# Patient Record
Sex: FEMALE | Race: WHITE
[De-identification: names, ages, dates, MRNs, and addresses within clinical notes are randomized per-mention and may not be internally consistent; named-entity substitution may affect disease eponyms.]

---

## 2017-09-29 ENCOUNTER — HOSPITAL ENCOUNTER (OUTPATIENT)
Dept: HOSPITAL 80 - BRMIMAGING | Age: 67
End: 2017-09-29
Attending: GENERAL ACUTE CARE HOSPITAL
Payer: COMMERCIAL

## 2017-09-29 DIAGNOSIS — Z12.31: Primary | ICD-10-CM

## 2017-09-29 DIAGNOSIS — Z80.3: ICD-10-CM

## 2017-09-29 PROCEDURE — G0202 SCR MAMMO BI INCL CAD: HCPCS

## 2018-06-08 ENCOUNTER — HOSPITAL ENCOUNTER (INPATIENT)
Dept: HOSPITAL 80 - F3N | Age: 68
LOS: 8 days | Discharge: NURSING, REHAB OR LONG-TERM CARE | DRG: 454 | End: 2018-06-16
Attending: NEUROLOGICAL SURGERY | Admitting: NEUROLOGICAL SURGERY
Payer: COMMERCIAL

## 2018-06-08 DIAGNOSIS — J44.9: ICD-10-CM

## 2018-06-08 DIAGNOSIS — Z87.891: ICD-10-CM

## 2018-06-08 DIAGNOSIS — R00.0: ICD-10-CM

## 2018-06-08 DIAGNOSIS — E11.9: ICD-10-CM

## 2018-06-08 DIAGNOSIS — D62: ICD-10-CM

## 2018-06-08 DIAGNOSIS — G89.29: ICD-10-CM

## 2018-06-08 DIAGNOSIS — M41.26: Primary | ICD-10-CM

## 2018-06-08 DIAGNOSIS — R41.0: ICD-10-CM

## 2018-06-08 DIAGNOSIS — M51.36: ICD-10-CM

## 2018-06-08 PROCEDURE — P9016 RBC LEUKOCYTES REDUCED: HCPCS

## 2018-06-08 PROCEDURE — C1713 ANCHOR/SCREW BN/BN,TIS/BN: HCPCS

## 2018-06-08 PROCEDURE — 0RG70AJ FUSION OF 2 TO 7 THORACIC VERTEBRAL JOINTS WITH INTERBODY FUSION DEVICE, POSTERIOR APPROACH, ANTERIOR COLUMN, OPEN APPROACH: ICD-10-PCS | Performed by: NEUROLOGICAL SURGERY

## 2018-06-08 PROCEDURE — P9041 ALBUMIN (HUMAN),5%, 50ML: HCPCS

## 2018-06-08 PROCEDURE — 0SG10AJ FUSION OF 2 OR MORE LUMBAR VERTEBRAL JOINTS WITH INTERBODY FUSION DEVICE, POSTERIOR APPROACH, ANTERIOR COLUMN, OPEN APPROACH: ICD-10-PCS | Performed by: NEUROLOGICAL SURGERY

## 2018-06-08 PROCEDURE — 0RGA071 FUSION OF THORACOLUMBAR VERTEBRAL JOINT WITH AUTOLOGOUS TISSUE SUBSTITUTE, POSTERIOR APPROACH, POSTERIOR COLUMN, OPEN APPROACH: ICD-10-PCS | Performed by: NEUROLOGICAL SURGERY

## 2018-06-08 PROCEDURE — 0RG7071 FUSION OF 2 TO 7 THORACIC VERTEBRAL JOINTS WITH AUTOLOGOUS TISSUE SUBSTITUTE, POSTERIOR APPROACH, POSTERIOR COLUMN, OPEN APPROACH: ICD-10-PCS | Performed by: NEUROLOGICAL SURGERY

## 2018-06-08 PROCEDURE — 8E0WXBZ COMPUTER ASSISTED PROCEDURE OF TRUNK REGION: ICD-10-PCS | Performed by: NEUROLOGICAL SURGERY

## 2018-06-08 PROCEDURE — 0SG1071 FUSION OF 2 OR MORE LUMBAR VERTEBRAL JOINTS WITH AUTOLOGOUS TISSUE SUBSTITUTE, POSTERIOR APPROACH, POSTERIOR COLUMN, OPEN APPROACH: ICD-10-PCS | Performed by: NEUROLOGICAL SURGERY

## 2018-06-08 PROCEDURE — 0RGA0AJ FUSION OF THORACOLUMBAR VERTEBRAL JOINT WITH INTERBODY FUSION DEVICE, POSTERIOR APPROACH, ANTERIOR COLUMN, OPEN APPROACH: ICD-10-PCS | Performed by: NEUROLOGICAL SURGERY

## 2018-06-08 PROCEDURE — 30233N1 TRANSFUSION OF NONAUTOLOGOUS RED BLOOD CELLS INTO PERIPHERAL VEIN, PERCUTANEOUS APPROACH: ICD-10-PCS | Performed by: NEUROLOGICAL SURGERY

## 2018-06-08 PROCEDURE — 4A1004G MONITORING OF CENTRAL NERVOUS ELECTRICAL ACTIVITY, INTRAOPERATIVE, OPEN APPROACH: ICD-10-PCS | Performed by: NEUROLOGICAL SURGERY

## 2018-06-08 PROCEDURE — 0SP30AZ REMOVAL OF INTERBODY FUSION DEVICE FROM LUMBOSACRAL JOINT, OPEN APPROACH: ICD-10-PCS | Performed by: NEUROLOGICAL SURGERY

## 2018-06-08 RX ADMIN — Medication SCH MG: at 20:39

## 2018-06-08 RX ADMIN — HYDROMORPHONE HYDROCHLORIDE PRN MG: 1 INJECTION, SOLUTION INTRAMUSCULAR; INTRAVENOUS; SUBCUTANEOUS at 20:42

## 2018-06-08 RX ADMIN — DOCUSATE SODIUM AND SENNOSIDES SCH TAB: 50; 8.6 TABLET ORAL at 20:39

## 2018-06-08 RX ADMIN — SODIUM CHLORIDE, SODIUM LACTATE, POTASSIUM CHLORIDE, AND CALCIUM CHLORIDE SCH MLS: 600; 310; 30; 20 INJECTION, SOLUTION INTRAVENOUS at 17:23

## 2018-06-08 RX ADMIN — DEXMEDETOMIDINE HYDROCHLORIDE SCH MLS: 4 INJECTION, SOLUTION INTRAVENOUS at 17:23

## 2018-06-08 RX ADMIN — Medication SCH MLS: at 22:00

## 2018-06-08 RX ADMIN — GABAPENTIN SCH MG: 300 CAPSULE ORAL at 20:39

## 2018-06-08 RX ADMIN — ACETAMINOPHEN SCH MG: 500 TABLET ORAL at 20:38

## 2018-06-08 RX ADMIN — SODIUM CHLORIDE, SODIUM LACTATE, POTASSIUM CHLORIDE, AND CALCIUM CHLORIDE SCH MLS: 600; 310; 30; 20 INJECTION, SOLUTION INTRAVENOUS at 20:51

## 2018-06-08 RX ADMIN — DEXMEDETOMIDINE HYDROCHLORIDE SCH MLS: 4 INJECTION, SOLUTION INTRAVENOUS at 22:30

## 2018-06-08 RX ADMIN — HYDROMORPHONE HYDROCHLORIDE PRN MG: 1 INJECTION, SOLUTION INTRAMUSCULAR; INTRAVENOUS; SUBCUTANEOUS at 17:01

## 2018-06-08 NOTE — PDANEPAE
ANE Past Medical History





- Cardiovascular History


Hx Hypertension: No


Hx Arrhythmias: No


Hx Chest Pain: No


Hx Coronary Artery / Peripheral Vascular Disease: No


Hx CHF / Valvular Disease: No


Hx Palpitations: No


Cardiovascular History Comment: HX OF LOW BP





- Pulmonary History


Hx COPD: No


Hx Asthma/Reactive Airway Disease: No


Hx Recent Upper Respiratory Infection: No


Hx Oxygen in Use at Home: No


Hx Sleep Apnea: No


Sleep Apnea Screening Result - Last Documented: Negative





- Neurologic History


Hx Cerebrovascular Accident: No


Hx Seizures: No


Hx Dementia: No





- Endocrine History


Hx Diabetes: No


Endocrine History Comment: HYPOTHYROID





- Renal History


Hx Renal Disorders: No





- Liver History


Hx Hepatic Disorders: No





- Neurological & Psychiatric Hx


Hx Neurological and Psychiatric Disorders: Yes


Neurological / Psychiatric History Comment: DEPRESSION





- Cancer History


Hx Cancer: Yes


Cancer History Comment: COLON





- Congenital Disorder History


Hx Congenital Disorders: Yes


Congenital History Comment: SCOLOSIS





- GI History


Hx Gastrointestinal Disorders: Yes


Gastrointestinal History Comment: GASTRIC BYPASS 2001.  GERD





- Other Health History


Other Health History: FLAT BACK.  DDD.  NT HAILEY TOES





- Chronic Pain History


Chronic Pain: Yes (LOWER AND MIDDLE BACK,SOMETIME CERVICAL)





- Surgical History


Prior Surgeries: FAILED ATTEMPT AT HARDWARE REMVL AND REVISION DUE TO CARDIAC 

ARREST 1/2018 AT [facility].  LUMBAR FUSION/SACROPELVIS FUSION 2013.  LT 

ILIAC VEIN ANGIOPLASTY.  NEURO STIMULATOR 2014.  HAILEY CATARACT.  TONSILLECTOMY.  

COLECTOMY FOR CA 1995.  TUBAL LIGATION.  HAILEY REMVL GANGLION CYST.  GASTRIC 

BYPASS 2001





ANE Review of Systems


Review of Systems: 








- Exercise capacity


METS (RN): 1 METS





ANE Patient History





- Allergies


Allergies/Adverse Reactions: 








alendronate sodium [From Fosamax] Allergy (Severe, Verified 10/12/15 13:47)


 Vomiting


amoxicillin trihydrate [From Augmentin] Allergy (Severe, Verified 10/12/15 13:47

)


 Vomiting


codeine [Codeine] Allergy (Severe, Verified 10/12/15 13:47)


 Vomiting


metformin HCl [From Glucophage] Allergy (Severe, Verified 10/12/15 13:47)


 N/V


NSAIDS (Non-Steroidal Anti-Inflamma Allergy (Severe, Verified 10/12/15 13:47)


 HIVES, VOMITING


potassium clavulanate [From Augmentin] Allergy (Severe, Verified 10/12/15 13:47)


 Vomiting


pregabalin [From Lyrica] Allergy (Severe, Verified 10/12/15 13:47)


 VOMITING, HIVES


Sulfa (Sulfonamide Antibiotics) [Sulfa(Sulfonamide Antibiotics)] Allergy (Severe

, Verified 10/12/15 13:47)


 HIVES, VOMITING


acetaminophen [From Darvocet-N 100] Allergy (Intermediate, Verified 10/12/15 13:

47)


 Hives


doxycycline Allergy (Intermediate, Verified 10/12/15 13:47)


 DIARRHEA, HIVES


propoxyphene napsylate [From Darvocet-N 100] Allergy (Intermediate, Verified 09/ 23/13 09:04)


 Hives


morphine Allergy (Unknown, Verified 09/23/13 09:04)


 Hives








- Home Medications


Home Medications: 








Abaloparatide [Tymlos] 1.56 ml SQ HS 06/04/18 [Last Taken 06/07/18 22:00]


Calcium Carb W/Vit D [Calcium Carb W/Vit D 500/200 (*)] 500 mg PO BID 06/04/18 [

Last Taken 06/01/18]


Cholecalciferol Vit D3 [Vitamin D3 2000 units tab (OTC)] 2,000 units PO DAILY 06 /04/18 [Last Taken 06/01/18]


Cyanocobalamin [Vitamin B12 1000MCG/ML (*)] 1,000 mcg IM Q30D 06/04/18 [Last 

Taken 06/01/18]


DULoxetine [Cymbalta 60 MG (*)] 120 mg PO DAILY 06/04/18 [Last Taken 06/08/18 03

:15]


Esomeprazole Magnesium [Nexium] 20 mg PO DAILY 06/04/18 [Last Taken 06/08/18 03:

15]


Herbals/Supplements -Info Only 1 ea PO DAILY 06/04/18 [Last Taken 06/01/18]


LORazepam [Ativan (*)] 1 mg PO HS 06/04/18 [Last Taken 06/07/18 22:00]


Levothyroxine [Synthroid 125 mcg (*)] 125 mcg PO DAILY06 06/04/18 [Last Taken 06 /08/18 03:15]


Multivitamins [Multivitamin (*)] 1 each PO DAILY 06/04/18 [Last Taken 06/01/18]


Omega-3 Fatty Acids [Fish Oil 1000 mg (*)] 1,000 mg PO BID 06/04/18 [Last Taken 

06/01/18]


Tizanidine HCl 4 mg PO Q8HRS PRN 06/04/18 [Last Taken 06/08/18 03:30]


oxyCODONE IR [Oxycodone Ir (*)] 15 mg PO Q6HRS PRN 06/04/18 [Last Taken 06/08/ 18 03:15]


traZODone [traZODone 150MG (*)] 300 mg PO HS 06/04/18 [Last Taken 06/07/18 22:00

]








- NPO status


NPO Since - Liquids (Date): 06/08/18


NPO Since - Liquids (Time): 03:15


NPO Since - Solids (Date): 06/07/18


NPO Since - Solids (Time): 22:00





- Smoking Hx


Smoking Status: Never smoked





ANE Labs/Vital Signs





- Vital Signs


Blood Pressure: 85/50


Heart Rate: 62


Respiratory Rate: 16


O2 Sat (%): 95


Height: 162.56 cm


Weight: 71.668 kg





ANE Physical Exam





- Airway


Neck exam: FROM


Mallampati Score: Class 2


Mouth exam: dentures





- Pulmonary


Pulmonary: no respiratory distress





- Cardiovascular


Cardiovascular: regular rate and rhythym





- ASA Status


ASA Status: III





ANE Anesthesia Plan


Anesthesia Plan: general endotracheal anesthesia (art line, possible blood 

products. PION risks discussed)

## 2018-06-08 NOTE — PDHPUP
History & Physical Update


H&P update statement: 


This history and physical update is based on an assessment of the patient which 

was completed after admission or registration (within 24 hours), but prior to 

the surgery/procedure.





H&P update: H&P reviewed & patient examined, no change in patient's condition 

since H&P completed (All questions answered.  Consents signed and site marked.)

## 2018-06-08 NOTE — PDMN
Medical Necessity


Medical necessity: [facility] IP only surgery; T10-Pelvis Revision w/L12 TLIF & L3 

PSO (cpt 78188, 77830, 52955, 85708)

## 2018-06-08 NOTE — POSTOPPROG
Post Op Note


Date of Operation: 06/08/18


Surgeon: Krishan Andersen


Assistant: Von Milan


Anesthesiologist: lissette miller


Anesthesia: GET(General Endotracheal)


Pre-op Diagnosis: degenerative scoliosis


Post-op Diagnosis: degenerative scoliosis


Indication: deformity and chronic pain


Procedure: T10-pelvis PSF, scoliosis deformity correction


Findings: see full op note


Inf/Abcess present in the surg proc area at time of surgery?: No


Depth: Organ Space


EBL: Greater than 1000


Drains: Dion Galdamez

## 2018-06-08 NOTE — POSTANESTH
Post Anesthetic Evaluation


Cardiovascular Status: Similar to Pre-Op Cond


Respiratory Status: Similar to Pre-op Cond.


Level of Consciousness/Mental Status: Mildly Sleepy, Arousable


Pain Control: Inadeq, Add Tx Required


Nausea/Vomiting Control: Adequate, Prn Tx Ordered


Complications Possibly Related to Anesthesia: None Noted

## 2018-06-08 NOTE — NEUSURGPN
Date of Surgery: 06/08/18


Post Op Day: 0


Assessment/Plan: 


68F s/p [id number] with L12 TLIF and L3 PSO. >1L blood loss intraprocedure.  





To ICU overnight for pain control and hemodynamics


LR 100ml/hr


precedex and dilaudid PCA for pain control


ADAT


no brace needed


follow H&H in AM


PT/OT


JPX2 to full suction


DVT ppx, WILDA's, SCD's, IVC placed preoperatively, Lovenox POD#1


Chavira out POD#1





Pt seen by and dw Dr. Nixon





Subjective: 


no back pain, complaining of right sided abdominal pain


Objective: 


VSS


NAD


speech clear and fluent, no facial droop


cnii-xii grossly intact


MAEx4, 5/5=


SILT


incision dressed, CDI.


JPx2 to full suction.  


Urinary Catheter in Place: Yes


Urinary Catheter Indication: Accurate I & O Required





Neurosurgery Physical Exam





- Vitals, I&O, Labs





 I and O











 06/07/18 06/08/18 06/09/18





 05:59 05:59 05:59


 


Weight   71.668 kg








 Vital Signs











Temp Pulse Resp BP Pulse Ox


 


 37.2 C   112 H  24 H  159/138 H  99 


 


 06/08/18 15:09  06/08/18 15:09  06/08/18 15:09  06/08/18 15:09  06/08/18 15:09














ICD10 Worksheet


Patient Problems: 


 Problems











Problem Status Onset


 


Bacteremia Acute

## 2018-06-09 RX ADMIN — DOCUSATE SODIUM AND SENNOSIDES SCH TAB: 50; 8.6 TABLET ORAL at 09:24

## 2018-06-09 RX ADMIN — OXYCODONE HYDROCHLORIDE PRN MG: 15 TABLET ORAL at 06:18

## 2018-06-09 RX ADMIN — DIAZEPAM PRN MG: 5 TABLET ORAL at 11:25

## 2018-06-09 RX ADMIN — SODIUM CHLORIDE, SODIUM LACTATE, POTASSIUM CHLORIDE, AND CALCIUM CHLORIDE SCH MLS: 600; 310; 30; 20 INJECTION, SOLUTION INTRAVENOUS at 16:34

## 2018-06-09 RX ADMIN — ACETAMINOPHEN SCH MG: 500 TABLET ORAL at 06:18

## 2018-06-09 RX ADMIN — Medication SCH UNITS: at 09:26

## 2018-06-09 RX ADMIN — Medication SCH MLS: at 06:17

## 2018-06-09 RX ADMIN — ACETAMINOPHEN SCH MG: 500 TABLET ORAL at 15:34

## 2018-06-09 RX ADMIN — LEVOTHYROXINE SODIUM SCH MCG: 125 TABLET ORAL at 09:24

## 2018-06-09 RX ADMIN — GABAPENTIN SCH MG: 300 CAPSULE ORAL at 06:17

## 2018-06-09 RX ADMIN — Medication SCH MG: at 21:26

## 2018-06-09 RX ADMIN — GABAPENTIN SCH MG: 300 CAPSULE ORAL at 21:26

## 2018-06-09 RX ADMIN — SODIUM CHLORIDE, SODIUM LACTATE, POTASSIUM CHLORIDE, AND CALCIUM CHLORIDE SCH MLS: 600; 310; 30; 20 INJECTION, SOLUTION INTRAVENOUS at 06:23

## 2018-06-09 RX ADMIN — ENOXAPARIN SODIUM SCH MG: 100 INJECTION SUBCUTANEOUS at 09:24

## 2018-06-09 RX ADMIN — DIAZEPAM PRN MG: 5 TABLET ORAL at 19:23

## 2018-06-09 RX ADMIN — PANTOPRAZOLE SODIUM SCH MG: 40 TABLET, DELAYED RELEASE ORAL at 09:25

## 2018-06-09 RX ADMIN — DOCUSATE SODIUM AND SENNOSIDES SCH TAB: 50; 8.6 TABLET ORAL at 21:26

## 2018-06-09 RX ADMIN — THERA TABS SCH EACH: TAB at 09:25

## 2018-06-09 RX ADMIN — GABAPENTIN SCH MG: 300 CAPSULE ORAL at 15:34

## 2018-06-09 RX ADMIN — OXYCODONE HYDROCHLORIDE PRN MG: 15 TABLET ORAL at 15:53

## 2018-06-09 RX ADMIN — HYDROMORPHONE HYDROCHLORIDE PRN MG: 1 INJECTION, SOLUTION INTRAMUSCULAR; INTRAVENOUS; SUBCUTANEOUS at 09:25

## 2018-06-09 RX ADMIN — ACETAMINOPHEN SCH MG: 500 TABLET ORAL at 21:27

## 2018-06-09 RX ADMIN — Medication SCH MG: at 09:26

## 2018-06-09 RX ADMIN — HYDROMORPHONE HYDROCHLORIDE PRN MG: 1 INJECTION, SOLUTION INTRAMUSCULAR; INTRAVENOUS; SUBCUTANEOUS at 03:45

## 2018-06-09 NOTE — NEUSURGPN
Date of Surgery: 06/08/18


Post Op Day: 1


Assessment/Plan: 


POD#1 68F s/p [id number] with L12 TLIF and L3 PSO. >1L blood loss 

intraprocedure.  





continue ICU overnight for pain control and hemodynamics,  wean precedex, 

Dilaudid PCA if needed for pain, titrate orals.  


continue LR 100ml/hr


500mL bolus x2 for hyptension, tachycardia


ADAT


no brace needed


continue to follow H&H while drains productive, 10.3/33 this am


PT/OT -goal for to chair today


Aggressive bowel protocol.  


JPX2 to full suction


DVT ppx, WILDA's, SCD's, IVC placed preoperatively, Lovenox POD#1


Chavira out POD#1


[facility]po[facility] late next week for deconditioning.  





dw Dr. Nixon





Subjective: 


continues to have back pain and some right sided abdominal pain.


Objective: 


NAD


vitals, hypotensive and mild tachycardia noted


EOMI, PEARLA


no facial droop


speech clear and fluent


MAEx4, antigravity, BLE strenght appears full


SILT


JPX2 to full suction, 


incision CDI.    left 200, right 205


Urinary Catheter in Place: Yes


Urinary Catheter Indication: Accurate I & O Required


Catheter Insertion Date: 06/08/18





- Physician


Discussed Patient with : Tiffani





Neurosurgery Physical Exam





- Vitals, I&O, Labs





 I and O











 06/08/18 06/09/18 06/10/18





 05:59 05:59 05:59


 


Intake Total  2605.9 


 


Output Total  815 


 


Balance  1790.9 


 


Weight  71.668 kg 


 


Intake:   


 


  Oral (ml)  300 


 


  IV Intake (ml)  2000 


 


  IV Infused (ml)  305.9 


 


    Dexmedetomidine HCl 400  64.9 





    mcg In Ns 100 ml @   





    Titrate IV CONT KALANI Rx#:   





    [id number]   


 


    Lr 1,000 ml @ 100 mls/hr  241 





    IV CONT KALANI Rx#:   





    [id number]   


 


Output:   


 


  Urine (ml)  410 


 


    Catheter  410 


 


  JESS Drain Output (ml)  405 


 


    Left Posterior  205 


 


    Right Posterior  200 








 Vital Signs











Temp Pulse Resp BP Pulse Ox


 


 36.9 C   119 H  19   102/70   95 


 


 06/09/18 10:00  06/09/18 10:00  06/09/18 10:00  06/09/18 10:00  06/09/18 10:00








 Laboratory Results





 06/09/18 06:14 





 06/09/18 06:14 











ICD10 Worksheet


Patient Problems: 


 Problems











Problem Status Onset


 


Bacteremia Acute

## 2018-06-09 NOTE — ASMTCMCOM
CM Note

 

CM Note                       

Notes:

6/9/2018 Case Management Note



Reviewed chart, discussed with pharmacist.



Pt admitted for flat back spinal deformity and subsequently underwent surgery: T10-pelvis 

revision/extension thoracic fusion and scoliosis deformity correction.



Pt has PT and OT evals ordered.  Awaiting recommendations.



Per chart pt is  and has family support from children.



Case Management d/c poc:  to be determined.



Case Management to follow.

 

Date Signed:  06/09/2018 12:33 PM

Electronically Signed By:Qi Elder RN

## 2018-06-10 RX ADMIN — DIAZEPAM PRN MG: 5 TABLET ORAL at 14:37

## 2018-06-10 RX ADMIN — GABAPENTIN SCH MG: 300 CAPSULE ORAL at 14:38

## 2018-06-10 RX ADMIN — POLYETHYLENE GLYCOL 3350 PRN GM: 17 POWDER, FOR SOLUTION ORAL at 08:45

## 2018-06-10 RX ADMIN — DIAZEPAM PRN MG: 5 TABLET ORAL at 05:52

## 2018-06-10 RX ADMIN — OXYCODONE HYDROCHLORIDE PRN MG: 15 TABLET ORAL at 05:52

## 2018-06-10 RX ADMIN — LEVOTHYROXINE SODIUM SCH MCG: 125 TABLET ORAL at 05:52

## 2018-06-10 RX ADMIN — ACETAMINOPHEN SCH MG: 500 TABLET ORAL at 21:30

## 2018-06-10 RX ADMIN — THERA TABS SCH EACH: TAB at 08:42

## 2018-06-10 RX ADMIN — GABAPENTIN SCH MG: 300 CAPSULE ORAL at 05:51

## 2018-06-10 RX ADMIN — Medication SCH UNITS: at 08:42

## 2018-06-10 RX ADMIN — SODIUM CHLORIDE AND POTASSIUM CHLORIDE SCH MLS: 9; 1.49 INJECTION, SOLUTION INTRAVENOUS at 22:28

## 2018-06-10 RX ADMIN — GABAPENTIN SCH MG: 300 CAPSULE ORAL at 21:31

## 2018-06-10 RX ADMIN — ACETAMINOPHEN SCH MG: 500 TABLET ORAL at 14:34

## 2018-06-10 RX ADMIN — DOCUSATE SODIUM AND SENNOSIDES SCH TAB: 50; 8.6 TABLET ORAL at 08:39

## 2018-06-10 RX ADMIN — HYDROMORPHONE HYDROCHLORIDE PRN MG: 1 INJECTION, SOLUTION INTRAMUSCULAR; INTRAVENOUS; SUBCUTANEOUS at 19:46

## 2018-06-10 RX ADMIN — ENOXAPARIN SODIUM SCH MG: 100 INJECTION SUBCUTANEOUS at 08:44

## 2018-06-10 RX ADMIN — OXYCODONE HYDROCHLORIDE PRN MG: 15 TABLET ORAL at 11:30

## 2018-06-10 RX ADMIN — Medication SCH MG: at 20:12

## 2018-06-10 RX ADMIN — Medication SCH MG: at 08:42

## 2018-06-10 RX ADMIN — PANTOPRAZOLE SODIUM SCH MG: 40 TABLET, DELAYED RELEASE ORAL at 08:41

## 2018-06-10 RX ADMIN — OXYCODONE HYDROCHLORIDE PRN MG: 15 TABLET ORAL at 18:40

## 2018-06-10 RX ADMIN — DOCUSATE SODIUM AND SENNOSIDES SCH: 50; 8.6 TABLET ORAL at 20:14

## 2018-06-10 RX ADMIN — ACETAMINOPHEN SCH: 500 TABLET ORAL at 06:05

## 2018-06-10 RX ADMIN — SODIUM CHLORIDE, SODIUM LACTATE, POTASSIUM CHLORIDE, AND CALCIUM CHLORIDE SCH MLS: 600; 310; 30; 20 INJECTION, SOLUTION INTRAVENOUS at 07:32

## 2018-06-10 RX ADMIN — SODIUM CHLORIDE AND POTASSIUM CHLORIDE SCH MLS: 9; 1.49 INJECTION, SOLUTION INTRAVENOUS at 12:27

## 2018-06-10 NOTE — PDINTPN
Intensivist Progress Note


Assessment/Plan: 











68 F s/p back surgery complicated by > 1 liter EBL with mild hypotension, but 

extubated postop. Hx of cardiac arrest in remote past so observed in ICU. Her 

BP was soft and HR elevated with sinus tachycardia which responded well to IVF





* Back surgery- stable. Pain control is challenging since she appears without 

excess pain but reports 9/10. Consider Ultram


* Tachycardia- likely related to volume. At baseline HR is about 100-110 and 

sinus. Continue IVF


* 


* OK for floor











Subjective: 





pain still an issue, but improved hemodynamics


Objective: 





 Vital Signs











Temp Pulse Resp BP Pulse Ox


 


 37.1 C   108 H  14   133/55 H  99 


 


 06/10/18 12:00  06/10/18 12:00  06/10/18 12:00  06/10/18 12:00  06/10/18 12:00








 Laboratory Results





 06/10/18 05:30 





 06/10/18 05:30 





 











 06/09/18 06/10/18 06/11/18





 05:59 05:59 05:59


 


Intake Total 2605.9 4303 675


 


Output Total 815 1110 860


 


Balance 1790.9 3193 -185














Physical Exam





- Physical Exam


General Appearance: alert, no apparent distress, obese


EENT: PERRL/EOMI


Neck: supple


Respiratory: lungs clear, normal breath sounds, No respiratory distress, No 

accessory muscle use


Cardiac/Chest: regular rate, rhythm, No edema


Abdomen: non-tender, soft, No distended


Skin: normal color, warm/dry, No cyanosis


Lymphatic: no adenopathy


Extremities: No pedal edema


Neuro/Psych: alert, normal mood/affect, oriented x 3





ICD10 Worksheet


Patient Problems: 


 Problems











Problem Status Onset


 


Bacteremia Acute

## 2018-06-10 NOTE — NEUSURGPN
Date of Surgery: 06/08/18


Post Op Day: 2


Assessment/Plan: 


POD#2 68F s/p P51-[id number] with L12 TLIF and L3 PSO. >1L blood loss 

intraprocedure.  





may transfer to floor today, dependent on team assessment of continued 

tachycardia.  


continue fluids given Tachycardia, will change to NS w/ K today 


no brace needed


continue to follow H&H while drains productive, 8.4/25 this am


PT/OT -continue to increase activity.  


Aggressive bowel protocol, miralax TID  


JPX2 to full suction


DVT ppx, WILDA's, SCD's, IVC placed preoperatively, Lovenox 


[facility]po[facility] late next week for deconditioning.  





dw Dr. Nixon





Subjective: 


complains of 9/10 post operative back pain today, but comfortable and smiling.  

preoperative back pain is gone. 


Objective: 


NAD


VSS, continues with mild tachycardia.  


AAOx4


EOMI, PEARLA


speech clear and fluent, no droop


MAEx4, 5/5=


SILT


incision dressed, some saturation on superior portion


JPx2 200/185 out


Urinary Catheter in Place: No


Catheter Insertion Date: 06/08/18





- Physician


Discussed Patient with : Tiffani





Neurosurgery Physical Exam





- Vitals, I&O, Labs





 I and O











 06/09/18 06/10/18 06/11/18





 05:59 05:59 05:59


 


Intake Total 2605.9 4303 


 


Output Total 815 1110 665


 


Balance 1790.9 3193 -665


 


Weight 71.668 kg  


 


Intake:   


 


  Oral (ml) 300 1050 


 


  IV Intake (ml) 2000 1200 


 


  IV Infused (ml) 305.9 2053 


 


    Dexmedetomidine HCl 400 64.9  





    mcg In Ns 100 ml @   





    Titrate IV CONT KALANI Rx#:   





    [id number]   


 


    Lr 1,000 ml @ 100 mls/hr 241 2053 





    IV CONT KALANI Rx#:   





    [id number]   


 


Output:   


 


  Urine (ml) 410 725 550


 


    Bedpan   350


 


    Bedside Commode  550 200


 


    Catheter 410 175 


 


  JESS Drain Output (ml) 405 385 115


 


    Left Posterior 205 200 30


 


    Right Posterior 200 185 85


 


Other:   


 


  Number of Voids   


 


    Bedside Commode  2 


 


  Number of Stools   


 


    Bedside Commode  0 








 Vital Signs











Temp Pulse Resp BP Pulse Ox


 


 36.6 C   120 H  23 H  104/51 L  96 


 


 06/10/18 08:00  06/10/18 10:00  06/10/18 10:00  06/10/18 10:00  06/10/18 08:00








 Laboratory Results





 06/10/18 05:30 





 06/10/18 05:30 











ICD10 Worksheet


Patient Problems: 


 Problems











Problem Status Onset


 


Bacteremia Acute

## 2018-06-11 RX ADMIN — Medication SCH MG: at 20:58

## 2018-06-11 RX ADMIN — Medication SCH MG: at 09:08

## 2018-06-11 RX ADMIN — THERA TABS SCH EACH: TAB at 09:08

## 2018-06-11 RX ADMIN — DOCUSATE SODIUM AND SENNOSIDES SCH: 50; 8.6 TABLET ORAL at 21:00

## 2018-06-11 RX ADMIN — ACETAMINOPHEN SCH MG: 500 TABLET ORAL at 15:30

## 2018-06-11 RX ADMIN — PANTOPRAZOLE SODIUM SCH MG: 40 TABLET, DELAYED RELEASE ORAL at 09:08

## 2018-06-11 RX ADMIN — GABAPENTIN SCH MG: 300 CAPSULE ORAL at 05:01

## 2018-06-11 RX ADMIN — ENOXAPARIN SODIUM SCH MG: 100 INJECTION SUBCUTANEOUS at 09:08

## 2018-06-11 RX ADMIN — SODIUM CHLORIDE AND POTASSIUM CHLORIDE SCH MLS: 9; 1.49 INJECTION, SOLUTION INTRAVENOUS at 23:58

## 2018-06-11 RX ADMIN — DOCUSATE SODIUM AND SENNOSIDES SCH TAB: 50; 8.6 TABLET ORAL at 09:08

## 2018-06-11 RX ADMIN — LEVOTHYROXINE SODIUM SCH MCG: 125 TABLET ORAL at 05:01

## 2018-06-11 RX ADMIN — GABAPENTIN SCH MG: 300 CAPSULE ORAL at 15:30

## 2018-06-11 RX ADMIN — Medication SCH UNITS: at 09:08

## 2018-06-11 RX ADMIN — ACETAMINOPHEN SCH MG: 500 TABLET ORAL at 22:07

## 2018-06-11 RX ADMIN — OXYCODONE HYDROCHLORIDE PRN MG: 15 TABLET ORAL at 16:28

## 2018-06-11 RX ADMIN — GABAPENTIN SCH MG: 300 CAPSULE ORAL at 21:02

## 2018-06-11 RX ADMIN — ACETAMINOPHEN SCH MG: 500 TABLET ORAL at 05:01

## 2018-06-11 RX ADMIN — OXYCODONE HYDROCHLORIDE PRN MG: 15 TABLET ORAL at 06:29

## 2018-06-11 RX ADMIN — OXYCODONE HYDROCHLORIDE PRN MG: 15 TABLET ORAL at 12:27

## 2018-06-11 NOTE — PDINTPN
Intensivist Progress Note


Assessment/Plan: 











68 F s/p back surgery complicated by > 1 liter EBL with mild hypotension, but 

extubated postop. Hx of cardiac arrest in remote past so observed in ICU. Her 

BP was soft and HR elevated with sinus tachycardia which responded well to IVF





* Back surgery- stable. Pain control is challenging since she appears without 

excess pain but reports 9/10. Consider Ultram


* Tachycardia- likely related to volume. At baseline HR is about 100-110 and 

sinus. Continue IVF. Complained of thigh pain today but bilateral US negative 

for DVT. 


* 


* OK for floor











06/11/18 13:52





Subjective: 





no events but continues with hallucinations


Objective: 





 Vital Signs











Temp Pulse Resp BP Pulse Ox


 


 37.0 C   110 H  19   133/69 H  97 


 


 06/11/18 12:00  06/11/18 12:00  06/11/18 12:00  06/11/18 12:00  06/11/18 12:00








 Laboratory Results





 06/11/18 12:30 





 06/11/18 09:00 





 











 06/10/18 06/11/18 06/12/18





 05:59 05:59 05:59


 


Intake Total 4303 3575 


 


Output Total 1110 1150 750


 


Balance 3193 2425 -750














Physical Exam





- Physical Exam


General Appearance: alert, no apparent distress, obese, other (confused)


EENT: PERRL/EOMI


Neck: supple


Respiratory: lungs clear, normal breath sounds, No respiratory distress, No 

accessory muscle use


Cardiac/Chest: regular rate, rhythm, No edema


Abdomen: non-tender, soft, No distended


Skin: normal color, warm/dry, No cyanosis


Lymphatic: no adenopathy


Extremities: No pedal edema


Neuro/Psych: alert, normal mood/affect, cognition abnormalities





ICD10 Worksheet


Patient Problems: 


 Problems











Problem Status Onset


 


Bacteremia Acute

## 2018-06-11 NOTE — NEUSURGPN
Assessment/Plan: 





Assessment/Plan: 


POD#3 68F s/p T27-[id number] with L12 TLIF and L3 PSO. >1L blood loss 

intraprocedure.  





may transfer to floor today, dependent on team assessment of continued 

tachycardia.  


continue fluids given Tachycardia, will change to NS w/ K today 


no brace needed


continue to follow H&H while drains productive, H:H this am pending


PT/OT -continue to increase activity.  


Aggressive bowel protocol, miralax TID  


JPX2, will d/c left JESS today


DVT ppx, WILDA's, SCD's, IVC placed preoperatively, Lovenox 


[facility]po[facility] late next week for deconditioning.  





Subjective: 


low back pain, tolerable with medications. 


Objective: 


NAD


VSS, continues with mild tachycardia.  


AAOx4


MAEx4, 5/5=


incision dressed, some saturation on superior portion





Catheter Insertion Date: 06/08/18





- Physician


Discussed Patient with : Tiffani





Neurosurgery Physical Exam





- Vitals, I&O, Labs





 I and O











 06/10/18 06/11/18 06/12/18





 05:59 05:59 05:59


 


Intake Total 4303 3575 


 


Output Total 1110 1150 50


 


Balance 3193 2425 -50


 


Intake:   


 


  Oral (ml) 1050 1575 


 


  IV Intake (ml) 1200  


 


  IV Infused (ml) 2053 2000 


 


    Lr 1,000 ml @ 100 mls/hr 2053  





    IV CONT KALANI Rx#:   





    [id number]   


 


    NS W/ 20 KCl/L 1,000 ml @  2000 





    100 mls/hr IV CONT KALANI   





    Rx#:[id number]   


 


Output:   


 


  Urine (ml) 725 775 


 


    Bedpan  450 


 


    Bedside Commode 550 325 


 


    Catheter 175  


 


  JESS Drain Output (ml) 385 375 50


 


    Left Posterior 200 115 30


 


    Right Posterior 185 260 20


 


Other:   


 


  Number of Voids   


 


    Bedpan  1 


 


    Bedside Commode 2 1 


 


    Toilet  1 


 


  Number of Stools   


 


    Bedside Commode 0 2 








 Vital Signs











Temp Pulse Resp BP Pulse Ox


 


 36.7 C   116 H  15   138/65 H  98 


 


 06/11/18 08:40  06/11/18 08:40  06/11/18 08:40  06/11/18 08:40  06/11/18 08:40








 Laboratory Results





 06/10/18 05:30 











ICD10 Worksheet


Patient Problems: 


 Problems











Problem Status Onset


 


Bacteremia Acute

## 2018-06-11 NOTE — ASMTCMCOM
CM Note

 

CM Note                       

Notes:

This is patient's 4th back surgery and she has been to numerous Rehab facilities. She does not want 


to go to [facility] or to [place]. She is trying to decide between [facility] and 

[facility]. This CM will send referral to both until she makes a decision.

 

Date Signed:  06/11/2018 02:45 PM

Electronically Signed By:Almaz Sheikh LCSW

## 2018-06-12 RX ADMIN — OXYCODONE HYDROCHLORIDE PRN MG: 15 TABLET ORAL at 00:04

## 2018-06-12 NOTE — GPROG
[f 
rep st]



                                                                    PROGRESS 
NOTE





PROGRESS NOTE



SUBJECTIVE:  The patient was seen and examined in the step-down unit.  She is 
complaining of some abdominal pain and has not been passing gas recently.  She 
denies any lower extremity pain, numbness, tingling, or weakness.



EXAM:  VITAL SIGNS:  Vitals are stable.  GENERAL:  The patient is awake and 
alert.  Mood and affect is appropriate.  HEENT:  Pupils equal and reactive.  
Facial expression is symmetrical.  Speech is fluent.  EXTREMITIES:  Muscle 
strength is well preserved in upper and lower extremities with a 5/5.  Incision 
with dressing CVI.



X-RAYS:  X-rays were reviewed and hardware is in good placement. 



JESS output was 160 cc in the last 24 hours.



ASSESSMENT:  In summary, the patient is a 68-year-old female who underwent 
surgery on June 8, 2018 by Dr. Nixon and Dr. Angel, with a T10 to pelvis 
posterior screw fixation with lumbar 3  and L1-2 transforaminal lumbar 
interbody fusion.



PLAN:  Plan today is to continue to work on pain control, as well as increasing 
the patient's activity level with physical and occupational therapy.  She does 
not need a brace.  We appreciate Medicine and Critical Care following.  Due to 
her abdominal pain today, we will work on having a bowel movement and also 
obtain an abdominal x-ray to rule out a postoperative ileus.  The patient is 
stable and may be transferred to the floor.  Treatment plan was discussed with 
Dr. Nixon.  



This note is being dictated rather then entered into [employer] directly, as 
[facility] is currently down.  Patient was seen at 8:00 a.m. on June 12, 2018.





Job #:  [id number]

MTDD

## 2018-06-13 LAB — PLATELET # BLD: 125 10^3/UL (ref 150–400)

## 2018-06-13 RX ADMIN — OXYCODONE HYDROCHLORIDE PRN MG: 15 TABLET ORAL at 12:07

## 2018-06-13 RX ADMIN — OXYCODONE HYDROCHLORIDE PRN MG: 15 TABLET ORAL at 06:36

## 2018-06-13 RX ADMIN — ACETAMINOPHEN SCH: 500 TABLET ORAL at 05:00

## 2018-06-13 RX ADMIN — PANTOPRAZOLE SODIUM SCH MG: 40 TABLET, DELAYED RELEASE ORAL at 08:23

## 2018-06-13 RX ADMIN — OXYCODONE HYDROCHLORIDE PRN MG: 15 TABLET ORAL at 17:42

## 2018-06-13 RX ADMIN — GABAPENTIN SCH MG: 300 CAPSULE ORAL at 06:28

## 2018-06-13 RX ADMIN — GABAPENTIN SCH: 300 CAPSULE ORAL at 05:14

## 2018-06-13 RX ADMIN — ENOXAPARIN SODIUM SCH MG: 100 INJECTION SUBCUTANEOUS at 08:22

## 2018-06-13 RX ADMIN — LEVOTHYROXINE SODIUM SCH MCG: 125 TABLET ORAL at 06:28

## 2018-06-13 RX ADMIN — LEVOTHYROXINE SODIUM SCH: 125 TABLET ORAL at 05:13

## 2018-06-13 RX ADMIN — POLYETHYLENE GLYCOL 3350 PRN GM: 17 POWDER, FOR SOLUTION ORAL at 08:22

## 2018-06-13 RX ADMIN — THERA TABS SCH EACH: TAB at 08:23

## 2018-06-13 RX ADMIN — DOCUSATE SODIUM AND SENNOSIDES SCH TAB: 50; 8.6 TABLET ORAL at 08:22

## 2018-06-13 RX ADMIN — GABAPENTIN SCH: 300 CAPSULE ORAL at 05:15

## 2018-06-13 RX ADMIN — OXYCODONE HYDROCHLORIDE PRN MG: 15 TABLET ORAL at 21:47

## 2018-06-13 RX ADMIN — HYDROMORPHONE HYDROCHLORIDE PRN MG: 1 INJECTION, SOLUTION INTRAMUSCULAR; INTRAVENOUS; SUBCUTANEOUS at 20:11

## 2018-06-13 RX ADMIN — DOCUSATE SODIUM AND SENNOSIDES SCH: 50; 8.6 TABLET ORAL at 05:14

## 2018-06-13 RX ADMIN — Medication SCH MG: at 08:23

## 2018-06-13 RX ADMIN — Medication SCH MG: at 20:12

## 2018-06-13 RX ADMIN — GABAPENTIN SCH MG: 300 CAPSULE ORAL at 13:37

## 2018-06-13 RX ADMIN — Medication SCH: at 05:13

## 2018-06-13 RX ADMIN — DOCUSATE SODIUM AND SENNOSIDES SCH TAB: 50; 8.6 TABLET ORAL at 20:12

## 2018-06-13 RX ADMIN — PANTOPRAZOLE SODIUM SCH: 40 TABLET, DELAYED RELEASE ORAL at 05:14

## 2018-06-13 RX ADMIN — GABAPENTIN SCH MG: 300 CAPSULE ORAL at 21:47

## 2018-06-13 RX ADMIN — ACETAMINOPHEN SCH MG: 500 TABLET ORAL at 21:47

## 2018-06-13 RX ADMIN — GABAPENTIN SCH: 300 CAPSULE ORAL at 05:13

## 2018-06-13 RX ADMIN — ACETAMINOPHEN SCH MG: 500 TABLET ORAL at 13:36

## 2018-06-13 RX ADMIN — SODIUM CHLORIDE AND POTASSIUM CHLORIDE SCH MLS: 9; 1.49 INJECTION, SOLUTION INTRAVENOUS at 15:46

## 2018-06-13 RX ADMIN — ENOXAPARIN SODIUM SCH: 100 INJECTION SUBCUTANEOUS at 05:13

## 2018-06-13 RX ADMIN — Medication SCH: at 05:14

## 2018-06-13 RX ADMIN — Medication SCH UNITS: at 08:23

## 2018-06-13 RX ADMIN — ACETAMINOPHEN SCH: 500 TABLET ORAL at 05:14

## 2018-06-13 RX ADMIN — ACETAMINOPHEN SCH MG: 500 TABLET ORAL at 06:28

## 2018-06-13 RX ADMIN — THERA TABS SCH: TAB at 05:13

## 2018-06-13 NOTE — SOAPPROG
SOAP Progress Note


Assessment/Plan: 


Assessment:





69 yo female POD #4 s/p T10-pelvis fusion with L3 PSO and L1/2 TLIF. 


Doing well today. Denies numbness, tingling or weakness.








Plan:


Transfer to floor today


PT/OT as tolerated


continue JESS drain


RN will change dressing this AM


follow H/H


D/W Dr Nixon





06/13/18 08:50





06/13/18 08:53





06/13/18 08:57





Subjective: 





Awake, alert, in bedside chair. Feeling good. No new issues per pt and RN





Objective: 





 Vital Signs











Temp Pulse Resp BP Pulse Ox


 


 36.8 C   89   17   122/71 H  96 


 


 06/13/18 07:40  06/13/18 07:40  06/13/18 07:40  06/13/18 07:40  06/13/18 07:40








 Laboratory Results





 06/13/18 07:49 





 06/11/18 09:00 





 











 06/12/18 06/13/18 06/14/18





 05:59 05:59 05:59


 


Intake Total 2198 1500 


 


Output Total 1460 50 


 


Balance 738 1450 








H/H: 7.2/22.4





Neuro:


Chronic tinlgling in bilateral toes


5/5 strength in bilateral LE


sens +LT except toes


Incision : CDI


JESS: 50 ml overniht





ICD10 Worksheet


Patient Problems: 


 Problems











Problem Status Onset


 


Bacteremia Acute

## 2018-06-13 NOTE — PDINTPN
Intensivist Progress Note


Assessment/Plan: 











68 F s/p back surgery complicated by > 1 liter EBL with mild hypotension, but 

extubated postop. Hx of cardiac arrest in remote past so observed in ICU. Her 

BP was soft and HR elevated with sinus tachycardia which responded well to IVF





* Back surgery- stable. Pain control remains challenging. Consider Ultram


* Tachycardia- likely related to volume. At baseline HR is about 100-110 and 

sinus. Continue IVF. Complained of thigh pain  but bilateral US negative for 

DVT. Resolved as of 6/13 AM


* 


* OK for floor











06/11/18 13:52





06/13/18 09:54





Subjective: 





no events overnight


Objective: 





 Vital Signs











Temp Pulse Resp BP Pulse Ox


 


 36.8 C   79   13   73/45 L  97 


 


 06/13/18 07:40  06/13/18 09:43  06/13/18 09:43  06/13/18 09:43  06/13/18 09:43








 Laboratory Results





 06/13/18 07:49 





 06/11/18 09:00 





 











 06/12/18 06/13/18 06/14/18





 05:59 05:59 05:59


 


Intake Total 2198 1500 


 


Output Total 1460 50 


 


Balance 738 1450 














Physical Exam





- Physical Exam


General Appearance: alert, no apparent distress, obese


EENT: PERRL/EOMI


Neck: supple


Respiratory: lungs clear, normal breath sounds, No respiratory distress, No 

accessory muscle use


Cardiac/Chest: regular rate, rhythm, No edema


Abdomen: non-tender, soft, No distended


Skin: normal color, warm/dry, No cyanosis


Lymphatic: no adenopathy


Extremities: No pedal edema


Neuro/Psych: alert, normal mood/affect, oriented x 3





ICD10 Worksheet


Patient Problems: 


 Problems











Problem Status Onset


 


Bacteremia Acute

## 2018-06-14 RX ADMIN — GABAPENTIN SCH MG: 300 CAPSULE ORAL at 12:57

## 2018-06-14 RX ADMIN — ACETAMINOPHEN SCH MG: 500 TABLET ORAL at 22:02

## 2018-06-14 RX ADMIN — Medication SCH MG: at 20:52

## 2018-06-14 RX ADMIN — GABAPENTIN SCH MG: 300 CAPSULE ORAL at 22:02

## 2018-06-14 RX ADMIN — OXYCODONE HYDROCHLORIDE PRN MG: 15 TABLET ORAL at 22:01

## 2018-06-14 RX ADMIN — GABAPENTIN SCH MG: 300 CAPSULE ORAL at 06:31

## 2018-06-14 RX ADMIN — OXYCODONE HYDROCHLORIDE PRN MG: 15 TABLET ORAL at 04:37

## 2018-06-14 RX ADMIN — ACETAMINOPHEN SCH MG: 500 TABLET ORAL at 06:31

## 2018-06-14 RX ADMIN — HYDROMORPHONE HYDROCHLORIDE PRN MG: 1 INJECTION, SOLUTION INTRAMUSCULAR; INTRAVENOUS; SUBCUTANEOUS at 20:52

## 2018-06-14 RX ADMIN — HYDROMORPHONE HYDROCHLORIDE PRN MG: 1 INJECTION, SOLUTION INTRAMUSCULAR; INTRAVENOUS; SUBCUTANEOUS at 18:03

## 2018-06-14 RX ADMIN — OXYCODONE HYDROCHLORIDE PRN MG: 15 TABLET ORAL at 08:44

## 2018-06-14 RX ADMIN — OXYCODONE HYDROCHLORIDE PRN MG: 15 TABLET ORAL at 12:57

## 2018-06-14 RX ADMIN — ENOXAPARIN SODIUM SCH MG: 100 INJECTION SUBCUTANEOUS at 08:18

## 2018-06-14 RX ADMIN — DOCUSATE SODIUM AND SENNOSIDES SCH TAB: 50; 8.6 TABLET ORAL at 08:18

## 2018-06-14 RX ADMIN — ACETAMINOPHEN SCH: 500 TABLET ORAL at 14:36

## 2018-06-14 RX ADMIN — HYDROMORPHONE HYDROCHLORIDE PRN MG: 1 INJECTION, SOLUTION INTRAMUSCULAR; INTRAVENOUS; SUBCUTANEOUS at 11:44

## 2018-06-14 RX ADMIN — Medication SCH UNITS: at 08:18

## 2018-06-14 RX ADMIN — LEVOTHYROXINE SODIUM SCH MCG: 125 TABLET ORAL at 06:31

## 2018-06-14 RX ADMIN — HYDROMORPHONE HYDROCHLORIDE PRN MG: 1 INJECTION, SOLUTION INTRAMUSCULAR; INTRAVENOUS; SUBCUTANEOUS at 16:57

## 2018-06-14 RX ADMIN — DOCUSATE SODIUM AND SENNOSIDES SCH TAB: 50; 8.6 TABLET ORAL at 20:52

## 2018-06-14 RX ADMIN — OXYCODONE HYDROCHLORIDE PRN MG: 15 TABLET ORAL at 18:02

## 2018-06-14 RX ADMIN — PANTOPRAZOLE SODIUM SCH MG: 40 TABLET, DELAYED RELEASE ORAL at 08:18

## 2018-06-14 RX ADMIN — THERA TABS SCH EACH: TAB at 08:18

## 2018-06-14 RX ADMIN — Medication SCH MG: at 08:18

## 2018-06-14 NOTE — ASMTCMCOM
CM Note

 

CM Note                       

Notes:

Patient is stable from back surgery although pain control remains challenging. DVT resolved as of 

6/13/18. Patient is most likely ready to move to the floor today. SNF rehab is still plan for 

d/c. Patient needs to choose between [facility] and [facility]. Sissy from [facility] was here 

yesterday concerned about patient's hallucinations but those have resolved as of 6-14-18 today. CM 

will follow.

 

Date Signed:  06/14/2018 12:25 PM

Electronically Signed By:Vivian Marino LCSW

## 2018-06-15 LAB — PLATELET # BLD: 197 10^3/UL (ref 150–400)

## 2018-06-15 RX ADMIN — Medication SCH MG: at 19:30

## 2018-06-15 RX ADMIN — METHOCARBAMOL PRN MG: 500 TABLET ORAL at 18:46

## 2018-06-15 RX ADMIN — OXYCODONE HYDROCHLORIDE PRN MG: 15 TABLET ORAL at 23:01

## 2018-06-15 RX ADMIN — ACETAMINOPHEN SCH MG: 500 TABLET ORAL at 23:01

## 2018-06-15 RX ADMIN — OXYCODONE HYDROCHLORIDE PRN MG: 15 TABLET ORAL at 19:30

## 2018-06-15 RX ADMIN — GABAPENTIN SCH MG: 300 CAPSULE ORAL at 14:20

## 2018-06-15 RX ADMIN — LEVOTHYROXINE SODIUM SCH MCG: 125 TABLET ORAL at 06:17

## 2018-06-15 RX ADMIN — ENOXAPARIN SODIUM SCH MG: 100 INJECTION SUBCUTANEOUS at 10:41

## 2018-06-15 RX ADMIN — OXYCODONE HYDROCHLORIDE PRN MG: 15 TABLET ORAL at 15:06

## 2018-06-15 RX ADMIN — Medication SCH UNITS: at 09:01

## 2018-06-15 RX ADMIN — Medication SCH MG: at 09:01

## 2018-06-15 RX ADMIN — GABAPENTIN SCH MG: 300 CAPSULE ORAL at 23:01

## 2018-06-15 RX ADMIN — PANTOPRAZOLE SODIUM SCH MG: 40 TABLET, DELAYED RELEASE ORAL at 09:01

## 2018-06-15 RX ADMIN — DOCUSATE SODIUM AND SENNOSIDES SCH TAB: 50; 8.6 TABLET ORAL at 09:01

## 2018-06-15 RX ADMIN — OXYCODONE HYDROCHLORIDE PRN MG: 15 TABLET ORAL at 02:16

## 2018-06-15 RX ADMIN — OXYCODONE HYDROCHLORIDE PRN MG: 15 TABLET ORAL at 06:16

## 2018-06-15 RX ADMIN — ACETAMINOPHEN SCH MG: 500 TABLET ORAL at 14:21

## 2018-06-15 RX ADMIN — DOCUSATE SODIUM AND SENNOSIDES SCH TAB: 50; 8.6 TABLET ORAL at 19:30

## 2018-06-15 RX ADMIN — GABAPENTIN SCH MG: 300 CAPSULE ORAL at 06:17

## 2018-06-15 RX ADMIN — THERA TABS SCH EACH: TAB at 09:01

## 2018-06-15 RX ADMIN — ACETAMINOPHEN SCH MG: 500 TABLET ORAL at 06:16

## 2018-06-15 RX ADMIN — OXYCODONE HYDROCHLORIDE PRN MG: 15 TABLET ORAL at 10:41

## 2018-06-15 NOTE — NEUSURGPN
Assessment/Plan: 





Assessment/Plan: 


POD#7 68F s/p [id number] with L12 TLIF and L3 PSO. >1L blood loss 

intraprocedure.  





SLight hypotension, improved with fluid bolus and H:H okay today


continue to follow H&H while drains productive, 


PT/OT -continue to increase activity.  


Aggressive bowel protocol, miralax TID  


JPX1, continue


DVT ppx, WILDA's, SCD's, IVC placed preoperatively, Lovenox 


[facility] for [facility]conditioning.  





Subjective: 


low back pain tolerable with medications. 


Objective: 


NAD


AAOx4


MAEx4, 5/5=


incision dressed,


Catheter Insertion Date: 06/08/18





Neurosurgery Physical Exam





- Vitals, I&O, Labs





 I and O











 06/14/18 06/15/18 06/16/18





 05:59 05:59 05:59


 


Intake Total 2627 400 


 


Output Total 130 150 


 


Balance 2497 250 


 


Intake:   


 


  Oral (ml) 350 400 


 


  IV Intake (ml) 500  


 


  IV Infused (ml) 1177  


 


    Albumin 5% 500 ml @ As 500  





    Directed IV ONCE ONE Rx#:   





    [id number]   


 


    NS W/ 20 KCl/L 1,000 ml @ 677  





    100 mls/hr IV CONT KALANI   





    Rx#:[id number]   


 


  Packed Red Blood Cells ( 600  





  ml)   


 


Output:   


 


  JESS Drain Output (ml) 130 150 


 


    Right Posterior 130 150 


 


Other:   


 


  Intake Quantity  Yes 





  Sufficient   


 


  Number of Voids   


 


    Toilet 2 2 1


 


  Number of Stools   


 


    Toilet 0 1 1








 Vital Signs











Temp Pulse Resp BP Pulse Ox


 


 36.4 C   63   20   105/58 L  93 


 


 06/15/18 08:00  06/15/18 08:00  06/15/18 08:00  06/15/18 09:35  06/15/18 08:00








 Laboratory Results





 06/15/18 08:50 





 06/15/18 08:50 











ICD10 Worksheet


Patient Problems: 


 Problems











Problem Status Onset


 


Bacteremia Acute

## 2018-06-15 NOTE — ASMTCMCOM
CM Note

 

CM Note                       

Notes:

Spoke with patient's nurse who clarified hallucinations were medicine related and patient has been 

clear from them for the past 48 hours. Contacted [facility] and relayed this information. They have 

accepted patient and can admit over the weekend if it is needed. Let the patient and family know 

she is accepted with [facility] and will be transferring there for her SNF rehab. CM will follow.

 

Date Signed:  06/15/2018 12:03 PM

Electronically Signed By:Vivian Marino LCSW

## 2018-06-16 VITALS — DIASTOLIC BLOOD PRESSURE: 72 MMHG | SYSTOLIC BLOOD PRESSURE: 100 MMHG

## 2018-06-16 RX ADMIN — METHOCARBAMOL PRN MG: 500 TABLET ORAL at 06:44

## 2018-06-16 RX ADMIN — ENOXAPARIN SODIUM SCH MG: 100 INJECTION SUBCUTANEOUS at 08:33

## 2018-06-16 RX ADMIN — Medication SCH MG: at 08:28

## 2018-06-16 RX ADMIN — DOCUSATE SODIUM AND SENNOSIDES SCH TAB: 50; 8.6 TABLET ORAL at 08:33

## 2018-06-16 RX ADMIN — GABAPENTIN SCH MG: 300 CAPSULE ORAL at 13:43

## 2018-06-16 RX ADMIN — ACETAMINOPHEN SCH: 500 TABLET ORAL at 13:43

## 2018-06-16 RX ADMIN — PANTOPRAZOLE SODIUM SCH MG: 40 TABLET, DELAYED RELEASE ORAL at 08:33

## 2018-06-16 RX ADMIN — LEVOTHYROXINE SODIUM SCH MCG: 125 TABLET ORAL at 06:43

## 2018-06-16 RX ADMIN — ACETAMINOPHEN SCH MG: 500 TABLET ORAL at 06:43

## 2018-06-16 RX ADMIN — GABAPENTIN SCH MG: 300 CAPSULE ORAL at 06:43

## 2018-06-16 RX ADMIN — OXYCODONE HYDROCHLORIDE PRN MG: 15 TABLET ORAL at 06:44

## 2018-06-16 RX ADMIN — THERA TABS SCH EACH: TAB at 08:29

## 2018-06-16 RX ADMIN — OXYCODONE HYDROCHLORIDE PRN MG: 15 TABLET ORAL at 10:47

## 2018-06-16 RX ADMIN — Medication SCH UNITS: at 08:28

## 2018-06-16 NOTE — NEUSURGPN
Assessment/Plan: 





Assessment/Plan: 


POD#8 68F s/p [id number] with L12 TLIF and L3 PSO. >1L blood loss 

intraprocedure.  





SLight hypotension, improved with fluid bolus and H:H okay today, 


PT/OT -continue to increase activity.  


Aggressive bowel protocol, miralax TID  


JPX1 output 120, removed today


DVT ppx, WILDA's, SCD's, IVC placed preoperatively, Lovenox 


Dispo-To Rehab today


Discussed with Dr. Nixon





Subjective: 


low back pain tolerable with medications. Increased pain this morning with 

removal or drain. No leg apin. 


Objective: 


NAD


AAOx4


MAEx4, 5/5=


incision clean, dry, intact, dressing reinforced after drain removal


JESS site clean, dry, JESS removed without difficulty


Catheter Insertion Date: 06/08/18





- Physician


Discussed Patient with : Tiffani





Neurosurgery Physical Exam





- Vitals, I&O, Labs





 I and O











 06/15/18 06/16/18 06/17/18





 05:59 05:59 05:59


 


Intake Total 400 1350 


 


Output Total 150 80 


 


Balance 250 1270 


 


Intake:   


 


  Oral (ml) 400 900 


 


  IV Intake (ml)  450 


 


Output:   


 


  JESS Drain Output (ml) 150 80 


 


    Right Posterior 150 80 


 


Other:   


 


  Intake Quantity Yes  





  Sufficient   


 


  Number of Voids   


 


    Toilet 2 1 


 


  Number of Stools   


 


    Toilet 1 1 








 Vital Signs











Temp Pulse Resp BP Pulse Ox


 


 36.8 C   65   14   116/61   93 


 


 06/16/18 07:38  06/16/18 07:38  06/16/18 00:00  06/16/18 07:38  06/16/18 07:38








 Laboratory Results





 06/15/18 08:50 





 06/15/18 08:50 











ICD10 Worksheet


Patient Problems: 


 Problems











Problem Status Onset


 


Bacteremia Acute

## 2018-06-16 NOTE — PDIAF
- Diagnosis


Code Status: Full Code





- Medication Management


Discharge Medications: 


 Medications to Continue on Transfer





Abaloparatide [Tymlos] 1.56 ml SQ HS 06/04/18 [Last Taken 06/07/18 22:00]


Calcium Carb W/Vit D [Calcium Carb W/Vit D 500/200 (*)] 500 mg PO BID 06/04/18 [

Last Taken 06/01/18]


Cholecalciferol Vit D3 [Vitamin D3 2000 units tab (OTC)] 2,000 units PO DAILY 06 /04/18 [Last Taken 06/01/18]


Cyanocobalamin [Vitamin B12 1000MCG/ML (*)] 1,000 mcg IM Q30D 06/04/18 [Last 

Taken 06/01/18]


DULoxetine [Cymbalta 60 MG (*)] 120 mg PO DAILY 06/04/18 [Last Taken 06/08/18 03

:15]


Esomeprazole Magnesium [Nexium] 20 mg PO DAILY 06/04/18 [Last Taken 06/08/18 03:

15]


Herbals/Supplements -Info Only 1 ea PO DAILY 06/04/18 [Last Taken 06/01/18]


LORazepam [Ativan (*)] 1 mg PO HS 06/04/18 [Last Taken 06/07/18 22:00]


Levothyroxine [Synthroid 125 mcg (*)] 125 mcg PO DAILY06 06/04/18 [Last Taken 06 /08/18 03:15]


Multivitamins [Multivitamin (*)] 1 each PO DAILY 06/04/18 [Last Taken 06/01/18]


Omega-3 Fatty Acids [Fish Oil 1000 mg (*)] 1,000 mg PO BID 06/04/18 [Last Taken 

06/01/18]


Tizanidine HCl 8 mg PO Q8HRS PRN 06/04/18 [Last Taken Unknown]


traZODone [traZODone 150MG (*)] 300 mg PO HS 06/04/18 [Last Taken 06/07/18 22:00

]


Acetaminophen [Tylenol  mg (*)] 1,000 mg PO Q8HRS  tab 06/11/18 [Last 

Taken Unknown]


Diazepam [Valium 5 MG (*)] 5 mg PO Q6HRS PRN #0 tab 06/11/18 [Last Taken Unknown

]


Sennosides/Docusate Sodium [Senokot-S] 1 - 2 tab PO BID  tab 06/11/18 [Last 

Taken Unknown]


oxyCODONE IR [Oxycodone Ir (*)] 15 - 20 mg PO Q4HRS PRN  tab 06/11/18 [Last 

Taken Unknown]








Long Term Antibiotics: n/a


Discharge Medications: Refer to the Discharge Home Medication list for PRN 

reason.





- Orders


Services needed: Registered Nurse, Physical Therapy, Occupational Therapy


Diet Recommendation: no restrictions on diet


Diet Texture: Regular Texture Diet


Wound Care Instructions: See additional instructions


Activity/Weight Bearing Restrictions: See additional instructions


Additional Instructions: 


No bending lifting twisting > 10 lbs, walking and light activity is encouraged


You may shower daily and wash your wound lightly with soap.  No baths or 

soaking until after your post op wound check


Take you Pain medications as prescribed, taper them as you are able


Continue stool softeners while on pain medications


We recommend you no longer wear your brace as it can disrupt wound healing.  


No NSAID medications (including IBProfen and Aleve) for 6 months post op


F/U with Dr. Nixon in 2 weeks for a wound check


Call [phone] with any additional questions or concerns.  








- Follow Up Care


Current Providers and Referrals: 


JAYDE LA [Other]


Osmar Nixon MD [Medical Doctor] -

## 2018-11-05 ENCOUNTER — HOSPITAL ENCOUNTER (OUTPATIENT)
Dept: HOSPITAL 80 - BRMIMAGING | Age: 68
End: 2018-11-05
Attending: GENERAL ACUTE CARE HOSPITAL
Payer: COMMERCIAL

## 2018-11-05 DIAGNOSIS — Z12.31: Primary | ICD-10-CM
